# Patient Record
Sex: MALE | Race: WHITE
[De-identification: names, ages, dates, MRNs, and addresses within clinical notes are randomized per-mention and may not be internally consistent; named-entity substitution may affect disease eponyms.]

---

## 2019-11-15 ENCOUNTER — HOSPITAL ENCOUNTER (EMERGENCY)
Dept: HOSPITAL 92 - SCSER | Age: 29
Discharge: HOME | End: 2019-11-15
Payer: COMMERCIAL

## 2019-11-15 DIAGNOSIS — F17.290: ICD-10-CM
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
## 2019-11-15 DIAGNOSIS — Z79.899: ICD-10-CM

## 2019-11-15 DIAGNOSIS — S29.012A: Primary | ICD-10-CM

## 2019-11-15 PROCEDURE — 96372 THER/PROPH/DIAG INJ SC/IM: CPT

## 2019-11-15 PROCEDURE — 72128 CT CHEST SPINE W/O DYE: CPT

## 2019-11-15 NOTE — RAD
RIGHT RIB SERIES WITH A PA VIEW OF THE CHEST



INDICATION: Rib pain with hemoptysis



COMPARISON: None.



FINDINGS:



Chest radiograph: The lungs are clear. Heart size is normal. No pleural effusion or pneumothorax is d
emonstrated.



Right Ribs: No displaced right-sided rib fracture is demonstrated.



IMPRESSION: No displaced right-sided rib fracture



Reported By: Pratik Shah 

Electronically Signed:  11/15/2019 3:53 PM

## 2019-11-15 NOTE — RAD
LEFT RIB SERIES 



INDICATION: Left-sided rib pain with hemoptysis



COMPARISON: None.



FINDINGS:



Visualized Left Chest: Visualized left lung is clear.  No pneumothorax.



Left Ribs: No displaced left-sided rib fracture is demonstrated.



IMPRESSION: No displaced left-sided rib fracture



Reported By: Pratik Shah 

Electronically Signed:  11/15/2019 3:54 PM

## 2019-11-15 NOTE — CT
Exam: Thoracic spine CT without contrast



HISTORY: Disc herniation. Pain. Fall.

Comparison: None



FINDINGS: Visualized mediastinum, lung parenchyma and solid organs are unremarkable.

No paraspinal or retroperitoneal mass, lymphadenopathy or hematoma.



Incidental 5 mm subpleural lymph node adjacent to the left major fissure

Thoracic spine vertebral body height is maintained. There is no thoracic spine fracture. No spondylol
isthesis or spondylolysis. Visualized bony thorax is intact.

There is fusion of the T8-T9 disc space. 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
madhuri.

Coronal images and sagittal images do not demonstrate any malalignment.



IMPRESSION: Unremarkable CT thoracic spine. No fracture. No acute abnormality. Additional chronic soraya
nges as described above.



Transcribed Date/Time: 11/15/2019 5:12 PM



Reported By: Khai Trevino 

Electronically Signed:  11/15/2019 5:17 PM

## 2019-11-16 ENCOUNTER — HOSPITAL ENCOUNTER (EMERGENCY)
Dept: HOSPITAL 92 - ERS | Age: 29
Discharge: HOME | End: 2019-11-16
Payer: COMMERCIAL

## 2019-11-16 DIAGNOSIS — F41.0: ICD-10-CM

## 2019-11-16 DIAGNOSIS — F17.290: ICD-10-CM

## 2019-11-16 DIAGNOSIS — N50.812: Primary | ICD-10-CM

## 2019-11-16 DIAGNOSIS — Z79.899: ICD-10-CM

## 2019-11-16 LAB
ALBUMIN SERPL BCG-MCNC: 5 G/DL (ref 3.5–5)
ALP SERPL-CCNC: 63 U/L (ref 40–110)
ALT SERPL W P-5'-P-CCNC: 16 U/L (ref 8–55)
ANION GAP SERPL CALC-SCNC: 16 MMOL/L (ref 10–20)
AST SERPL-CCNC: 16 U/L (ref 5–34)
BASOPHILS # BLD AUTO: 0 THOU/UL (ref 0–0.2)
BASOPHILS NFR BLD AUTO: 0.2 % (ref 0–1)
BILIRUB SERPL-MCNC: 0.9 MG/DL (ref 0.2–1.2)
BUN SERPL-MCNC: 14 MG/DL (ref 8.9–20.6)
CALCIUM SERPL-MCNC: 10.1 MG/DL (ref 7.8–10.44)
CHLORIDE SERPL-SCNC: 106 MMOL/L (ref 98–107)
CO2 SERPL-SCNC: 24 MMOL/L (ref 22–29)
CREAT CL PREDICTED SERPL C-G-VRATE: 0 ML/MIN (ref 70–130)
DRUG SCREEN CUTOFF: (no result)
EOSINOPHIL # BLD AUTO: 0 THOU/UL (ref 0–0.7)
EOSINOPHIL NFR BLD AUTO: 0.1 % (ref 0–10)
GLOBULIN SER CALC-MCNC: 3.9 G/DL (ref 2.4–3.5)
GLUCOSE SERPL-MCNC: 97 MG/DL (ref 70–105)
HGB BLD-MCNC: 16.1 G/DL (ref 14–18)
LYMPHOCYTES # BLD: 1.5 THOU/UL (ref 1.2–3.4)
LYMPHOCYTES NFR BLD AUTO: 20 % (ref 21–51)
MCH RBC QN AUTO: 29.7 PG (ref 27–31)
MCV RBC AUTO: 86.3 FL (ref 78–98)
MEDTOX CONTROL LINE VALID?: (no result)
MEDTOX READER #: (no result)
MONOCYTES # BLD AUTO: 0.7 THOU/UL (ref 0.11–0.59)
MONOCYTES NFR BLD AUTO: 9 % (ref 0–10)
NEUTROPHILS # BLD AUTO: 5.1 THOU/UL (ref 1.4–6.5)
NEUTROPHILS NFR BLD AUTO: 70.8 % (ref 42–75)
PLATELET # BLD AUTO: 174 THOU/UL (ref 130–400)
POTASSIUM SERPL-SCNC: 4.2 MMOL/L (ref 3.5–5.1)
PROT UR STRIP.AUTO-MCNC: 30 MG/DL
RBC # BLD AUTO: 5.41 MILL/UL (ref 4.7–6.1)
RBC UR QL AUTO: (no result) HPF (ref 0–3)
SODIUM SERPL-SCNC: 142 MMOL/L (ref 136–145)
WBC # BLD AUTO: 7.3 THOU/UL (ref 4.8–10.8)
WBC UR QL AUTO: (no result) HPF (ref 0–3)

## 2019-11-16 PROCEDURE — 80306 DRUG TEST PRSMV INSTRMNT: CPT

## 2019-11-16 PROCEDURE — 87591 N.GONORRHOEAE DNA AMP PROB: CPT

## 2019-11-16 PROCEDURE — 36415 COLL VENOUS BLD VENIPUNCTURE: CPT

## 2019-11-16 PROCEDURE — 96374 THER/PROPH/DIAG INJ IV PUSH: CPT

## 2019-11-16 PROCEDURE — 93976 VASCULAR STUDY: CPT

## 2019-11-16 PROCEDURE — 80053 COMPREHEN METABOLIC PANEL: CPT

## 2019-11-16 PROCEDURE — 87491 CHLMYD TRACH DNA AMP PROBE: CPT

## 2019-11-16 PROCEDURE — 81003 URINALYSIS AUTO W/O SCOPE: CPT

## 2019-11-16 PROCEDURE — 85025 COMPLETE CBC W/AUTO DIFF WBC: CPT

## 2019-11-16 PROCEDURE — 96376 TX/PRO/DX INJ SAME DRUG ADON: CPT

## 2019-11-16 PROCEDURE — 81015 MICROSCOPIC EXAM OF URINE: CPT

## 2019-11-16 PROCEDURE — 87086 URINE CULTURE/COLONY COUNT: CPT

## 2019-11-16 PROCEDURE — 96375 TX/PRO/DX INJ NEW DRUG ADDON: CPT

## 2019-11-16 PROCEDURE — 76870 US EXAM SCROTUM: CPT

## 2019-11-16 NOTE — ULT
TESTICULAR ULTRASOUND: 

11/16/19

 

COMPARISON: 

1/12/19.

 

HISTORY: 

Left testicular pain. 

 

TECHNIQUE:  

Gray scale, color flow, Doppler imaging with spectral waveform analysis performed in the testicles. 

 

FINDINGS: 

 

Right hemiscrotum:

No testicular masses. Appropriate echotexture. Right testicle measures 2.9 x 1.9 x 4.6 cm. Right epid
idymis has a normal echotexture, measuring 1.2 x 1.2 x 1.0 cm. No hydrocele. 

 

Left hemiscrotum:

Left testicle has a homogeneous echotexture. No testicular masses.  Left testicle measures 2.3 x 2.7 
x 4.4 cm. There is a 0.3 x 0.2 x 0.2 cm anechoic focus in the head of the epididymis. Left epididymis
 measures 1.0 x 1.1 x 1.2 cm. No hydrocele. 

 

Testicular Doppler:

There is vascular flow to the left and right testicle.

 

IMPRESSION: 

1.      Essentially unremarkable testicular ultrasound. 

2.      Small cyst in the head of the left epididymis. 

 

POS: PPP